# Patient Record
Sex: FEMALE | Race: WHITE | NOT HISPANIC OR LATINO | Employment: FULL TIME | URBAN - METROPOLITAN AREA
[De-identification: names, ages, dates, MRNs, and addresses within clinical notes are randomized per-mention and may not be internally consistent; named-entity substitution may affect disease eponyms.]

---

## 2021-09-15 ENCOUNTER — TRANSCRIBE ORDERS (OUTPATIENT)
Dept: ADMINISTRATIVE | Facility: HOSPITAL | Age: 31
End: 2021-09-15

## 2021-09-15 ENCOUNTER — HOSPITAL ENCOUNTER (OUTPATIENT)
Dept: GENERAL RADIOLOGY | Facility: HOSPITAL | Age: 31
Discharge: HOME | End: 2021-09-15
Admitting: NURSE PRACTITIONER

## 2021-09-15 DIAGNOSIS — R31.9 HEMATURIA SYNDROME: Primary | ICD-10-CM

## 2021-09-15 DIAGNOSIS — R10.31 RIGHT LOWER QUADRANT PAIN: ICD-10-CM

## 2021-09-15 DIAGNOSIS — R31.9 HEMATURIA SYNDROME: ICD-10-CM

## 2021-09-15 PROCEDURE — 74018 RADEX ABDOMEN 1 VIEW: CPT

## 2021-09-17 ENCOUNTER — HOSPITAL ENCOUNTER (OUTPATIENT)
Dept: ULTRASOUND IMAGING | Facility: HOSPITAL | Age: 31
Discharge: HOME | End: 2021-09-17
Admitting: NURSE PRACTITIONER

## 2021-09-17 DIAGNOSIS — R10.31 RIGHT LOWER QUADRANT PAIN: ICD-10-CM

## 2021-09-17 PROCEDURE — 76856 US EXAM PELVIC COMPLETE: CPT

## 2021-10-08 ENCOUNTER — TRANSCRIBE ORDERS (OUTPATIENT)
Dept: ADMINISTRATIVE | Facility: HOSPITAL | Age: 31
End: 2021-10-08

## 2021-10-08 DIAGNOSIS — R31.21 ASYMPTOMATIC MICROSCOPIC HEMATURIA: Primary | ICD-10-CM

## 2021-10-12 ENCOUNTER — HOSPITAL ENCOUNTER (OUTPATIENT)
Dept: CT IMAGING | Facility: HOSPITAL | Age: 31
Discharge: HOME | End: 2021-10-12
Admitting: UROLOGY

## 2021-10-12 DIAGNOSIS — R31.21 ASYMPTOMATIC MICROSCOPIC HEMATURIA: ICD-10-CM

## 2021-10-12 LAB — CREAT BLDA-MCNC: 0.9 MG/DL

## 2021-10-12 PROCEDURE — 74178 CT ABD&PLV WO CNTR FLWD CNTR: CPT

## 2021-10-12 PROCEDURE — 82565 ASSAY OF CREATININE: CPT

## 2021-10-12 PROCEDURE — 0 IOPAMIDOL PER 1 ML: Performed by: UROLOGY

## 2021-10-12 RX ADMIN — IOPAMIDOL 100 ML: 755 INJECTION, SOLUTION INTRAVENOUS at 10:50

## 2022-04-12 ENCOUNTER — TRANSCRIBE ORDERS (OUTPATIENT)
Dept: ADMINISTRATIVE | Facility: HOSPITAL | Age: 32
End: 2022-04-12

## 2022-04-12 DIAGNOSIS — M25.562 PAIN, JOINT, KNEE, LEFT: Primary | ICD-10-CM

## 2022-04-19 ENCOUNTER — HOSPITAL ENCOUNTER (OUTPATIENT)
Dept: MRI IMAGING | Facility: HOSPITAL | Age: 32
Discharge: HOME | End: 2022-04-19
Admitting: NURSE PRACTITIONER

## 2022-04-19 DIAGNOSIS — M25.562 PAIN, JOINT, KNEE, LEFT: ICD-10-CM

## 2022-04-19 PROCEDURE — 73721 MRI JNT OF LWR EXTRE W/O DYE: CPT

## 2022-04-21 ENCOUNTER — OFFICE VISIT (OUTPATIENT)
Dept: ORTHOPEDIC SURGERY | Facility: CLINIC | Age: 32
End: 2022-04-21

## 2022-04-21 VITALS — OXYGEN SATURATION: 99 % | BODY MASS INDEX: 45.99 KG/M2 | HEART RATE: 85 BPM | WEIGHT: 293 LBS | HEIGHT: 67 IN

## 2022-04-21 DIAGNOSIS — S83.105A DISLOCATION OF LEFT KNEE, INITIAL ENCOUNTER: ICD-10-CM

## 2022-04-21 DIAGNOSIS — M25.462 EFFUSION OF LEFT KNEE: ICD-10-CM

## 2022-04-21 DIAGNOSIS — M25.562 LEFT KNEE PAIN, UNSPECIFIED CHRONICITY: Primary | ICD-10-CM

## 2022-04-21 PROCEDURE — 99203 OFFICE O/P NEW LOW 30 MIN: CPT | Performed by: ORTHOPAEDIC SURGERY

## 2022-04-21 RX ORDER — ERGOCALCIFEROL 1.25 MG/1
CAPSULE ORAL
COMMUNITY

## 2022-04-21 RX ORDER — IBUPROFEN 800 MG/1
TABLET ORAL EVERY 8 HOURS SCHEDULED
COMMUNITY

## 2022-04-21 RX ORDER — MEDROXYPROGESTERONE ACETATE 10 MG/1
TABLET ORAL
COMMUNITY

## 2022-04-21 NOTE — PROGRESS NOTES
"Chief Complaint  Pain and Initial Evaluation of the Left Knee     Subjective      Patricia Schwartz presents to [facility] for evaluation of the left knee. The patient reports she slipped and injured her left knee on vacation. She felt a pop when she fell on her left knee and felt like her knee dislocated. She is ambulating with crutches. She was at [place] when the injury occurred. She has had an MRI that we reviewed with her today.     Allergies   Allergen Reactions   • Sulfa Antibiotics Rash        Social History     Socioeconomic History   • Marital status:    Tobacco Use   • Smoking status: Never Smoker   • Smokeless tobacco: Never Used   Substance and Sexual Activity   • Alcohol use: Never   • Drug use: Never   • Sexual activity: Yes     Partners: Male     Birth control/protection: Surgical        Review of Systems     Objective   Vital Signs:   Pulse 85   Ht 170.2 cm (67\")   Wt (!) 138 kg (304 lb)   SpO2 99%   BMI 47.61 kg/m²       Physical Exam  Constitutional:       Appearance: Normal appearance. The patient is well-developed and normal weight.   HENT:      Head: Normocephalic.      Right Ear: Hearing and external ear normal.      Left Ear: Hearing and external ear normal.      Nose: Nose normal.   Eyes:      Conjunctiva/sclera: Conjunctivae normal.   Cardiovascular:      Rate and Rhythm: Normal rate.   Pulmonary:      Effort: Pulmonary effort is normal.      Breath sounds: No wheezing or rales.   Abdominal:      Palpations: Abdomen is soft.      Tenderness: There is no abdominal tenderness.   Musculoskeletal:      Cervical back: Normal range of motion.   Skin:     Findings: No rash.   Neurological:      Mental Status: The patient is alert and oriented to person, place, and time.   Psychiatric:         Mood and Affect: Mood and affect normal.         Judgment: Judgment normal.       Ortho Exam      Left knee- ROM 0-35 degrees. Tender to the peripatellar region. " Small effusion, mild swelling.  1+ medial and lateral translation of the patella. Neurovascularly intact. Negative Roxanna's. Negative Lachman's. Stable to anterior/posterior drawer.     Procedures    X-Ray Report:  Left knee(s) X-Ray  Indication: Evaluation of left knee pain  AP, Lateral, Standing and Sunrise view(s)  Findings: no acute fracture, subluxation or dislocation. Mild to moderate degenerative changes.   Prior studies available for comparison: yes         Imaging Results (Most Recent)     Procedure Component Value Units Date/Time    XR Knee 3 View Left [[id number]] Resulted: 04/21/22 1023     Updated: 04/21/22 1026           Result Review :       MRI Knee Left Without Contrast    Result Date: 4/20/2022  Narrative: PROCEDURE: MRI KNEE LEFT  WO CONTRAST  COMPARISON: None  INDICATIONS: Pain, joint, mackenzie, left      TECHNIQUE: A complete multi-planar MRI was performed.   FINDINGS:  The cruciate ligaments, collateral ligaments, and extensor mechanism of the knee are intact.  The iliotibial band and the lateral collateral ligament appear intact.  The capsule appears grossly unremarkable.  There is no evidence for medial or lateral meniscal tear.  There is a moderate-sized joint effusion present.  Patchy edema is seen along the lateral aspect of the lateral femoral condyle.  No fracture line identified.  Lateral soft tissue swelling is present.  No significant degenerative changes identified.  No focal cartilage defect identified.  There is mild lateral subluxation of the patella with irregularity seen along the medial aspect of the patella along the right neck line with probable focal destruction of the medial retinaculum (series 9, image 16).  The lateral retinaculum appears unremarkable.  The cortical margins are intact. No significant abnormal focal fluid collection is observed within the surrounding soft tissues.      Impression:   1. Findings likely related to transient patellar dislocation with significant  edema seen along the lateral aspect of the lateral femoral condyle consistent with contusion with focal destruction of the medial retinaculum with edema seen along the medial aspect of the patella.  A component of the lateral femoral condyle edema and adjacent lateral soft tissue swelling may be related to contusion. 2. Moderate-sized joint effusion. 3. Otherwise, no evidence of internal derangement..     RADHA BRINK MD       Electronically Signed and Approved By: RADHA BRINK MD on 4/20/2022 at 7:03                      Assessment and Plan     DX: Left knee dislocation   Left knee effusion       Discussed the treatment plan with the patient.  Discussed conservative treatment with the patient. She can be weight bearing as tolerated with her leg in extension. Order for physical therapy given today. Rehab race placed today with knee in full extension. May unlock to do ROM 0-30deg.     Call or return if worsening symptoms.    Follow Up     4 weeks      Patient was given instructions and counseling regarding her condition or for health maintenance advice. Please see specific information pulled into the AVS if appropriate.     Scribed for Riky Lerma MD by Gloria Schmitt.  04/21/22   10:41 EDT    I have personally performed the services described in this document as scribed by the above individual and it is both accurate and complete. Riky Lerma MD 04/21/22

## 2022-04-22 ENCOUNTER — APPOINTMENT (OUTPATIENT)
Dept: MRI IMAGING | Facility: HOSPITAL | Age: 32
End: 2022-04-22

## 2022-05-27 ENCOUNTER — OFFICE VISIT (OUTPATIENT)
Dept: ORTHOPEDIC SURGERY | Facility: CLINIC | Age: 32
End: 2022-05-27

## 2022-05-27 VITALS — BODY MASS INDEX: 45.99 KG/M2 | OXYGEN SATURATION: 98 % | HEIGHT: 67 IN | HEART RATE: 83 BPM | WEIGHT: 293 LBS

## 2022-05-27 DIAGNOSIS — M25.562 LEFT KNEE PAIN, UNSPECIFIED CHRONICITY: Primary | ICD-10-CM

## 2022-05-27 DIAGNOSIS — M25.462 EFFUSION OF LEFT KNEE: ICD-10-CM

## 2022-05-27 DIAGNOSIS — S83.105D DISLOCATION OF LEFT KNEE, SUBSEQUENT ENCOUNTER: ICD-10-CM

## 2022-05-27 PROCEDURE — 99213 OFFICE O/P EST LOW 20 MIN: CPT | Performed by: PHYSICIAN ASSISTANT

## 2022-05-27 NOTE — PROGRESS NOTES
Chief Complaint  Pain and Follow-up of the Left Knee    Subjective          Patricia Schwartz is a 32 y.o. female  presents to [facility] for   History of Present Illness      Patient presents for follow-up evaluation of left knee pain, left knee dislocation/effusion.  Patient was seen by Dr. Lerma on 4/21/2022 she had a knee injury on a vacation where she felt like she dislocated her knee/patella, left knee.  Patient had MRI which Dr. Lerma reviewed with her.  Dr. Lerma with the patient into an ACL brace locked in extension and with 30 degrees flexion.  Patient states the brace did not fit/work she states she was mainly Ace wrapping her knee.  Patient was scheduled for physical therapy but she states the first available appointment was not until June 6 and she was originally scheduled on 4/21/2022.  Patient states therapy was overbooked, cannot accommodate her sooner.  Patient states she wound up having treatment from her massage therapist who K taped her knee and helped her knee and she states that her knee has gotten better with K taping/rest and her own therapy/movement.  Patient states her main concern is climbing up into her jeep or sitting down into her low couch otherwise she states her knee has been doing well she states she has been walking more, she presents without a brace without Ace wrap, no new complaints.      Allergies   Allergen Reactions   • Sulfa Antibiotics Rash        Social History     Socioeconomic History   • Marital status:    Tobacco Use   • Smoking status: Never Smoker   • Smokeless tobacco: Never Used   Substance and Sexual Activity   • Alcohol use: Never   • Drug use: Never   • Sexual activity: Yes     Partners: Male     Birth control/protection: Surgical        REVIEW OF SYSTEMS    Constitutional: Denies fevers, chills, weight loss  Cardiovascular: Denies chest pain, shortness of breath  Skin: Denies rashes, acute skin  "changes  Neurologic: Denies headache, loss of consciousness  MSK: Left knee pain      Objective   Vital Signs:   Pulse 83   Ht 170.2 cm (67\")   Wt (!) 138 kg (304 lb)   SpO2 98%   BMI 47.61 kg/m²     Body mass index is 47.61 kg/m².    Physical Exam    Left knee: Skin is intact, no erythema, no ecchymosis, no swelling, no effusion, negative compression testing, full extension, flexion 115, stable to varus/valgus stress, stable anterior/posterior drawer.    Procedures    Imaging Results (Most Recent)     None           Result Review :   The following data was reviewed by: NICOLETTE Campbell on 05/27/2022:               Assessment and Plan    Diagnoses and all orders for this visit:    1. Left knee pain, unspecified chronicity (Primary)    2. Dislocation of left knee, subsequent encounter    3. Effusion of left knee        Discussed diagnosis and treatment options with the patient patient was advised we recommend continued conservative treatment she will see her therapy appointment on June 6 to see if they have any other help they can give her otherwise she will continue activity as tolerated she states she will follow-up as needed if any new or concerning symptoms occur she will follow-up sooner.    Call or return if worsening symptoms.    Follow Up   Return if symptoms worsen or fail to improve.  Patient was given instructions and counseling regarding her condition or for health maintenance advice. Please see specific information pulled into the AVS if appropriate.       "

## 2023-01-26 ENCOUNTER — TRANSCRIBE ORDERS (OUTPATIENT)
Dept: ADMINISTRATIVE | Facility: HOSPITAL | Age: 33
End: 2023-01-26
Payer: COMMERCIAL

## 2023-01-26 DIAGNOSIS — N20.0 KIDNEY STONE: Primary | ICD-10-CM

## 2023-02-01 ENCOUNTER — HOSPITAL ENCOUNTER (OUTPATIENT)
Dept: CT IMAGING | Facility: HOSPITAL | Age: 33
Discharge: HOME | End: 2023-02-01
Admitting: NURSE PRACTITIONER
Payer: COMMERCIAL

## 2023-02-01 DIAGNOSIS — N20.0 KIDNEY STONE: ICD-10-CM

## 2023-02-01 PROCEDURE — 74176 CT ABD & PELVIS W/O CONTRAST: CPT

## 2024-09-30 ENCOUNTER — CLINICAL SUPPORT (OUTPATIENT)
Dept: FAMILY MEDICINE CLINIC | Facility: CLINIC | Age: 34
End: 2024-09-30
Payer: MEDICAID

## 2024-09-30 DIAGNOSIS — Z11.1 SCREENING FOR TUBERCULOSIS: Primary | ICD-10-CM

## 2024-09-30 PROCEDURE — 86580 TB INTRADERMAL TEST: CPT

## 2024-10-02 ENCOUNTER — CLINICAL SUPPORT (OUTPATIENT)
Dept: FAMILY MEDICINE CLINIC | Facility: CLINIC | Age: 34
End: 2024-10-02
Payer: MEDICAID

## 2024-10-02 LAB
INDURATION: 0 MM (ref 0–10)
Lab: NORMAL
Lab: NORMAL
TB SKIN TEST: NORMAL